# Patient Record
Sex: FEMALE | URBAN - METROPOLITAN AREA
[De-identification: names, ages, dates, MRNs, and addresses within clinical notes are randomized per-mention and may not be internally consistent; named-entity substitution may affect disease eponyms.]

---

## 2023-06-09 ENCOUNTER — DOCUMENTATION (OUTPATIENT)
Dept: OCCUPATIONAL MEDICINE | Facility: CLINIC | Age: 33
End: 2023-06-09

## 2023-06-09 NOTE — PROGRESS NOTES
Pt has an appointment for Pre-Employment Physical with [facility] on 6/14/23.     Currently missing the following vaccine documentation:     Tdap  COVID-19  MMR  Varicella  Hepatitis B       Contacted via e-mail on 6/9/23, requesting missing documentation. If unable to obtain by the appointment date, lab titers will be drawn, and/or vaccines will be given.

## 2023-06-14 ENCOUNTER — HOSPITAL ENCOUNTER (OUTPATIENT)
Facility: MEDICAL CENTER | Age: 33
End: 2023-06-14
Attending: PREVENTIVE MEDICINE
Payer: COMMERCIAL

## 2023-06-14 ENCOUNTER — EH NON-PROVIDER (OUTPATIENT)
Dept: OCCUPATIONAL MEDICINE | Facility: CLINIC | Age: 33
End: 2023-06-14

## 2023-06-14 ENCOUNTER — EMPLOYEE HEALTH (OUTPATIENT)
Dept: OCCUPATIONAL MEDICINE | Facility: CLINIC | Age: 33
End: 2023-06-14

## 2023-06-14 DIAGNOSIS — Z02.89 ENCOUNTER FOR OCCUPATIONAL HEALTH ASSESSMENT: ICD-10-CM

## 2023-06-14 DIAGNOSIS — Z02.89 ENCOUNTER FOR OCCUPATIONAL HEALTH ASSESSMENT: Primary | ICD-10-CM

## 2023-06-14 DIAGNOSIS — Z02.1 PRE-EMPLOYMENT HEALTH SCREENING EXAMINATION: ICD-10-CM

## 2023-06-14 LAB
AMP AMPHETAMINE: NORMAL
BAR BARBITURATES: NORMAL
BZO BENZODIAZEPINES: NORMAL
COC COCAINE: NORMAL
INT CON NEG: NORMAL
INT CON POS: NORMAL
MDMA ECSTASY: NORMAL
MET METHAMPHETAMINES: NORMAL
MTD METHADONE: NORMAL
OPI OPIATES: NORMAL
OXY OXYCODONE: NORMAL
PCP PHENCYCLIDINE: NORMAL
POC URINE DRUG SCREEN OCDRS: NORMAL
THC: NORMAL

## 2023-06-14 PROCEDURE — 8915 PR COMPREHENSIVE PHYSICAL: Performed by: NURSE PRACTITIONER

## 2023-06-14 PROCEDURE — 86480 TB TEST CELL IMMUN MEASURE: CPT | Performed by: PREVENTIVE MEDICINE

## 2023-06-14 PROCEDURE — 94375 RESPIRATORY FLOW VOLUME LOOP: CPT | Performed by: PREVENTIVE MEDICINE

## 2023-06-14 PROCEDURE — 80305 DRUG TEST PRSMV DIR OPT OBS: CPT | Performed by: PREVENTIVE MEDICINE

## 2023-06-16 LAB
GAMMA INTERFERON BACKGROUND BLD IA-ACNC: 0.03 IU/ML
M TB IFN-G BLD-IMP: NEGATIVE
M TB IFN-G CD4+ BCKGRND COR BLD-ACNC: 0 IU/ML
MITOGEN IGNF BCKGRD COR BLD-ACNC: >10 IU/ML
QFT TB2 - NIL TBQ2: 0 IU/ML

## 2023-07-29 ENCOUNTER — PHARMACY VISIT (OUTPATIENT)
Dept: PHARMACY | Facility: MEDICAL CENTER | Age: 33
End: 2023-07-29
Payer: COMMERCIAL

## 2023-07-29 PROCEDURE — RXMED WILLOW AMBULATORY MEDICATION CHARGE: Performed by: STUDENT IN AN ORGANIZED HEALTH CARE EDUCATION/TRAINING PROGRAM

## 2023-07-29 RX ORDER — DOXYCYCLINE HYCLATE 100 MG
TABLET ORAL
Qty: 20 TABLET | Refills: 0 | OUTPATIENT
Start: 2023-07-28

## 2024-12-06 ENCOUNTER — HOSPITAL ENCOUNTER (OUTPATIENT)
Facility: MEDICAL CENTER | Age: 34
End: 2024-12-06
Payer: COMMERCIAL

## 2024-12-06 ENCOUNTER — OFFICE VISIT (OUTPATIENT)
Dept: URGENT CARE | Facility: PHYSICIAN GROUP | Age: 34
End: 2024-12-06
Payer: COMMERCIAL

## 2024-12-06 VITALS
RESPIRATION RATE: 16 BRPM | SYSTOLIC BLOOD PRESSURE: 124 MMHG | OXYGEN SATURATION: 99 % | TEMPERATURE: 99 F | HEIGHT: 68 IN | BODY MASS INDEX: 29.7 KG/M2 | WEIGHT: 196 LBS | DIASTOLIC BLOOD PRESSURE: 70 MMHG | HEART RATE: 79 BPM

## 2024-12-06 DIAGNOSIS — N76.0 ACUTE VAGINITIS: ICD-10-CM

## 2024-12-06 DIAGNOSIS — Z71.1 CONCERN ABOUT STI IN FEMALE WITHOUT DIAGNOSIS: ICD-10-CM

## 2024-12-06 DIAGNOSIS — N89.8 VAGINAL DISCHARGE: ICD-10-CM

## 2024-12-06 DIAGNOSIS — R30.0 DYSURIA: ICD-10-CM

## 2024-12-06 DIAGNOSIS — Z72.51 HIGH RISK HETEROSEXUAL BEHAVIOR: ICD-10-CM

## 2024-12-06 LAB
APPEARANCE UR: CLEAR
BILIRUB UR STRIP-MCNC: NORMAL MG/DL
COLOR UR AUTO: YELLOW
GLUCOSE UR STRIP.AUTO-MCNC: NORMAL MG/DL
KETONES UR STRIP.AUTO-MCNC: NORMAL MG/DL
LEUKOCYTE ESTERASE UR QL STRIP.AUTO: NORMAL
NITRITE UR QL STRIP.AUTO: NORMAL
PH UR STRIP.AUTO: 6.5 [PH] (ref 5–8)
POCT INT CON NEG: NEGATIVE
POCT INT CON POS: POSITIVE
POCT URINE PREGNANCY TEST: NEGATIVE
PROT UR QL STRIP: NORMAL MG/DL
RBC UR QL AUTO: NORMAL
SP GR UR STRIP.AUTO: 1.01
UROBILINOGEN UR STRIP-MCNC: 0.2 MG/DL

## 2024-12-06 PROCEDURE — 3074F SYST BP LT 130 MM HG: CPT

## 2024-12-06 PROCEDURE — 87491 CHLMYD TRACH DNA AMP PROBE: CPT

## 2024-12-06 PROCEDURE — 3078F DIAST BP <80 MM HG: CPT

## 2024-12-06 PROCEDURE — 87480 CANDIDA DNA DIR PROBE: CPT

## 2024-12-06 PROCEDURE — 87086 URINE CULTURE/COLONY COUNT: CPT

## 2024-12-06 PROCEDURE — 81025 URINE PREGNANCY TEST: CPT

## 2024-12-06 PROCEDURE — 87510 GARDNER VAG DNA DIR PROBE: CPT

## 2024-12-06 PROCEDURE — 99204 OFFICE O/P NEW MOD 45 MIN: CPT

## 2024-12-06 PROCEDURE — 4293F PT SCRND HGH-RISK SEX BEHAV: CPT

## 2024-12-06 PROCEDURE — 87660 TRICHOMONAS VAGIN DIR PROBE: CPT

## 2024-12-06 PROCEDURE — 81002 URINALYSIS NONAUTO W/O SCOPE: CPT

## 2024-12-06 PROCEDURE — 87591 N.GONORRHOEAE DNA AMP PROB: CPT

## 2024-12-06 RX ORDER — DOXYCYCLINE 100 MG/1
CAPSULE ORAL
COMMUNITY
Start: 2024-12-03

## 2024-12-06 ASSESSMENT — ENCOUNTER SYMPTOMS
CONSTIPATION: 0
NAUSEA: 0
FLANK PAIN: 0
MYALGIAS: 0
COUGH: 0
ABDOMINAL PAIN: 0
DIARRHEA: 0
VERTIGO: 0
VISUAL CHANGE: 0
FEVER: 0
HEARTBURN: 0
VOMITING: 0
SORE THROAT: 0
BLOOD IN STOOL: 0
CHILLS: 0

## 2024-12-07 LAB
C TRACH DNA SPEC QL NAA+PROBE: NEGATIVE
CANDIDA DNA VAG QL PROBE+SIG AMP: NEGATIVE
G VAGINALIS DNA VAG QL PROBE+SIG AMP: NEGATIVE
N GONORRHOEA DNA SPEC QL NAA+PROBE: NEGATIVE
SPECIMEN SOURCE: NORMAL
T VAGINALIS DNA VAG QL PROBE+SIG AMP: NEGATIVE

## 2024-12-07 NOTE — PROGRESS NOTES
Subjective:   Marina Hinojosa is a 34 y.o. female who presents for UTI (Std concerns, Brown discharge,low back pain )      With complaints of possible STD exposure after a new sexual contact a week ago.  Patient states that usually after sexual contact she did have some irritation and discomfort that she thought was likely from condom use.  Over the last week patient stated that she has had some low back soreness and pain as well as vaginal discharge that she describes as being brownish.  She states she has never had an STI before, and does not usually have vaginal discharge of this color.  She denies any fever, chills, body aches, no abdominal pain nausea or vomiting.  No hematuria or burning with urination.  States that she has only had vaginal sex with this contact no oral or anal sex    Sexually Transmitted Diseases  This is a new problem. The current episode started in the past 7 days. The problem has been unchanged. Associated symptoms include urinary symptoms. Pertinent negatives include no abdominal pain, chills, congestion, coughing, fever, myalgias, nausea, sore throat, vertigo, visual change or vomiting.       Review of Systems   Constitutional:  Negative for chills and fever.   HENT:  Negative for congestion and sore throat.    Respiratory:  Negative for cough.    Gastrointestinal:  Negative for abdominal pain, blood in stool, constipation, diarrhea, heartburn, melena, nausea and vomiting.   Genitourinary:  Positive for dysuria. Negative for flank pain, frequency, hematuria and urgency.   Musculoskeletal:  Negative for myalgias.   Neurological:  Negative for vertigo.   All other systems reviewed and are negative.    Refer to HPI for additional details.    During this visit, appropriate PPE was worn, and hand hygiene was performed.    PMH:  has no past medical history on file.    MEDS:   Current Outpatient Medications:     doxycycline (MONODOX) 100 MG capsule, , Disp: , Rfl:     metronidazole (METROCREAM)  "0.75 % cream, , Disp: , Rfl:     doxycycline (VIBRAMYCIN) 100 MG Tab, Take one tablet by mouth every 12 hours for 10 days. (Patient not taking: Reported on 12/6/2024), Disp: 20 Tablet, Rfl: 0    ALLERGIES: No Known Allergies  SURGHX: History reviewed. No pertinent surgical history.  SOCHX:  reports that she has never smoked. She has never used smokeless tobacco.    FH: Per HPI as applicable/pertinent.    Medications, Allergies, and current problem list reviewed today in Epic.     Objective:     /70 (BP Location: Left arm, Patient Position: Sitting, BP Cuff Size: Adult)   Pulse 79   Temp 37.2 °C (99 °F) (Temporal)   Resp 16   Ht 1.727 m (5' 8\")   Wt 88.9 kg (196 lb)   SpO2 99%     Physical Exam  Vitals reviewed.   Constitutional:       General: She is not in acute distress.     Appearance: She is not ill-appearing.   HENT:      Mouth/Throat:      Mouth: Mucous membranes are moist.      Pharynx: Oropharynx is clear.   Cardiovascular:      Rate and Rhythm: Normal rate.      Pulses: Normal pulses.   Pulmonary:      Effort: Pulmonary effort is normal.   Abdominal:      General: Abdomen is flat. There is no distension.      Palpations: There is no mass.      Tenderness: There is no abdominal tenderness. There is no right CVA tenderness, left CVA tenderness, guarding or rebound.      Hernia: No hernia is present.   Genitourinary:     Comments: Deferred and not indicated  Skin:     General: Skin is warm and dry.   Neurological:      Mental Status: She is alert.         Assessment/Plan:     Diagnosis and associated orders:     1. Acute vaginitis    2. Dysuria  - POCT Urinalysis  - Chlamydia/GC, PCR (Urine); Future  - POCT Pregnancy  - URINE CULTURE(NEW); Future    3. Vaginal discharge  - VAGINAL PATHOGENS DNA PANEL; Future  - Chlamydia/GC, PCR (Urine); Future    4. Concern about STI in female without diagnosis  - HIV AG/AB Combo Assay Screening; Future  - T.Pallidum AB NIRALI (Screening); Future  - Hepatitis C Virus " Antibody; Future  - HEP B Surface Antibody; Future  - Hep B Core AB Total; Future  - Hep B Surface Antigen; Future    5. High risk heterosexual behavior    Other orders  - doxycycline (MONODOX) 100 MG capsule  - metronidazole (METROCREAM) 0.75 % cream;  (Patient not taking: Reported on 12/6/2024)     Comments/MDM:     Patient history and physical exam are consistent with exposure to STI with concomitant vaginal discharge and vaginitis.  Patient presents well in clinic and no signs or symptoms endorsed or on physical exam indicative of infection, worsening condition, and no red flags noticed.  Overall patient's symptoms are focalized to vaginal discharge and some slight low back pain vaginitis.  In clinic UA was negative for leukocytes nitrates or any other abnormality.  Had discussion with patient about various different testing options and possible etiologies for symptoms.  I did recommend doing testing for chlamydia and gonorrhea as well as vaginal pathogen swab.  Patient is agreeable to this.  Will also send out urine culture to look for any occult infection.  Patient is also requesting full testing for STIs including blood work.  Will put in orders for hep B/C, T palladium and HIV testing  Patient does take 100 mg doxycycline twice daily for hormonal acne control, and given her overall well presentation in clinic we will hold off on any prophylactic medication at this point until vaginal pathogen swab results.  Will change and modify plan as needed  Outpatient management will consist of safe sex practices, condom use, copious fluids, can use previously prescribed Pyridium, incorporate sitz bath's to help with vaginitis symptoms, ibuprofen as needed for back pain, outpatient blood work, monitor symptoms  Follow up in 3-5 days if no improvement in symptoms           Differential diagnosis, natural history, supportive care, and indications for immediate follow-up discussed.    Advised the patient to follow-up with  the primary care physician for recheck, reevaluation, and consideration of further management.    Please note that this dictation was created using voice recognition software. I have made a reasonable attempt to correct obvious errors, but I expect that there are errors of grammar and possibly content that I did not discover before finalizing the note.    This note was electronically signed by ALVINO Alcantar

## 2024-12-09 LAB
BACTERIA UR CULT: NORMAL
SIGNIFICANT IND 70042: NORMAL
SITE SITE: NORMAL
SOURCE SOURCE: NORMAL